# Patient Record
Sex: FEMALE | Race: WHITE | Employment: STUDENT | URBAN - METROPOLITAN AREA
[De-identification: names, ages, dates, MRNs, and addresses within clinical notes are randomized per-mention and may not be internally consistent; named-entity substitution may affect disease eponyms.]

---
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
## 2024-01-03 ENCOUNTER — OFFICE (OUTPATIENT)
Dept: URBAN - METROPOLITAN AREA PATHOLOGY 1 | Facility: PATHOLOGY | Age: 19
End: 2024-01-03
Payer: COMMERCIAL

## 2024-01-03 ENCOUNTER — AMBULATORY SURGICAL CENTER (OUTPATIENT)
Dept: URBAN - METROPOLITAN AREA SURGERY 7 | Facility: SURGERY | Age: 19
End: 2024-01-03
Payer: COMMERCIAL

## 2024-01-03 VITALS
DIASTOLIC BLOOD PRESSURE: 68 MMHG | HEART RATE: 90 BPM | HEIGHT: 69 IN | RESPIRATION RATE: 16 BRPM | DIASTOLIC BLOOD PRESSURE: 52 MMHG | OXYGEN SATURATION: 95 % | SYSTOLIC BLOOD PRESSURE: 130 MMHG | SYSTOLIC BLOOD PRESSURE: 107 MMHG | SYSTOLIC BLOOD PRESSURE: 130 MMHG | RESPIRATION RATE: 18 BRPM | HEART RATE: 72 BPM | HEART RATE: 64 BPM | HEART RATE: 79 BPM | HEART RATE: 77 BPM | RESPIRATION RATE: 16 BRPM | RESPIRATION RATE: 20 BRPM | RESPIRATION RATE: 19 BRPM | OXYGEN SATURATION: 94 % | HEART RATE: 65 BPM | DIASTOLIC BLOOD PRESSURE: 83 MMHG | OXYGEN SATURATION: 95 % | DIASTOLIC BLOOD PRESSURE: 55 MMHG | RESPIRATION RATE: 22 BRPM | DIASTOLIC BLOOD PRESSURE: 96 MMHG | HEART RATE: 97 BPM | DIASTOLIC BLOOD PRESSURE: 83 MMHG | SYSTOLIC BLOOD PRESSURE: 118 MMHG | HEART RATE: 77 BPM | HEART RATE: 114 BPM | SYSTOLIC BLOOD PRESSURE: 136 MMHG | OXYGEN SATURATION: 91 % | RESPIRATION RATE: 20 BRPM | OXYGEN SATURATION: 96 % | TEMPERATURE: 98.8 F | WEIGHT: 200 LBS | SYSTOLIC BLOOD PRESSURE: 125 MMHG | HEART RATE: 90 BPM | DIASTOLIC BLOOD PRESSURE: 72 MMHG | DIASTOLIC BLOOD PRESSURE: 72 MMHG | SYSTOLIC BLOOD PRESSURE: 134 MMHG | RESPIRATION RATE: 22 BRPM | DIASTOLIC BLOOD PRESSURE: 106 MMHG | HEART RATE: 92 BPM | WEIGHT: 200 LBS | OXYGEN SATURATION: 98 % | DIASTOLIC BLOOD PRESSURE: 96 MMHG | DIASTOLIC BLOOD PRESSURE: 58 MMHG | DIASTOLIC BLOOD PRESSURE: 106 MMHG | DIASTOLIC BLOOD PRESSURE: 62 MMHG | OXYGEN SATURATION: 100 % | HEART RATE: 97 BPM | SYSTOLIC BLOOD PRESSURE: 152 MMHG | SYSTOLIC BLOOD PRESSURE: 134 MMHG | OXYGEN SATURATION: 98 % | HEIGHT: 69 IN | SYSTOLIC BLOOD PRESSURE: 118 MMHG | OXYGEN SATURATION: 91 % | DIASTOLIC BLOOD PRESSURE: 67 MMHG | SYSTOLIC BLOOD PRESSURE: 119 MMHG | SYSTOLIC BLOOD PRESSURE: 147 MMHG | SYSTOLIC BLOOD PRESSURE: 121 MMHG | OXYGEN SATURATION: 100 % | DIASTOLIC BLOOD PRESSURE: 62 MMHG | SYSTOLIC BLOOD PRESSURE: 113 MMHG | OXYGEN SATURATION: 96 % | RESPIRATION RATE: 19 BRPM | DIASTOLIC BLOOD PRESSURE: 69 MMHG | SYSTOLIC BLOOD PRESSURE: 119 MMHG | DIASTOLIC BLOOD PRESSURE: 58 MMHG | DIASTOLIC BLOOD PRESSURE: 55 MMHG | SYSTOLIC BLOOD PRESSURE: 147 MMHG | TEMPERATURE: 98.8 F | DIASTOLIC BLOOD PRESSURE: 52 MMHG | SYSTOLIC BLOOD PRESSURE: 125 MMHG | SYSTOLIC BLOOD PRESSURE: 121 MMHG | DIASTOLIC BLOOD PRESSURE: 69 MMHG | HEART RATE: 65 BPM | SYSTOLIC BLOOD PRESSURE: 113 MMHG | HEART RATE: 79 BPM | HEART RATE: 92 BPM | RESPIRATION RATE: 18 BRPM | HEART RATE: 72 BPM | SYSTOLIC BLOOD PRESSURE: 136 MMHG | HEART RATE: 64 BPM | DIASTOLIC BLOOD PRESSURE: 68 MMHG | HEART RATE: 114 BPM | SYSTOLIC BLOOD PRESSURE: 152 MMHG | DIASTOLIC BLOOD PRESSURE: 67 MMHG | OXYGEN SATURATION: 94 % | SYSTOLIC BLOOD PRESSURE: 107 MMHG

## 2024-01-03 DIAGNOSIS — R10.84 GENERALIZED ABDOMINAL PAIN: ICD-10-CM

## 2024-01-03 DIAGNOSIS — R19.4 CHANGE IN BOWEL HABIT: ICD-10-CM

## 2024-01-03 DIAGNOSIS — K31.89 OTHER DISEASES OF STOMACH AND DUODENUM: ICD-10-CM

## 2024-01-03 DIAGNOSIS — K29.30 CHRONIC SUPERFICIAL GASTRITIS WITHOUT BLEEDING: ICD-10-CM

## 2024-01-03 PROCEDURE — 45380 COLONOSCOPY AND BIOPSY: CPT | Performed by: INTERNAL MEDICINE

## 2024-01-03 PROCEDURE — 43239 EGD BIOPSY SINGLE/MULTIPLE: CPT | Performed by: INTERNAL MEDICINE

## 2024-01-03 PROCEDURE — 88305 TISSUE EXAM BY PATHOLOGIST: CPT | Performed by: PATHOLOGY

## 2024-01-03 PROCEDURE — 88342 IMHCHEM/IMCYTCHM 1ST ANTB: CPT | Performed by: PATHOLOGY

## 2024-01-08 LAB
PDF: PDF REPORT: (no result)
PDF: PDF REPORT: (no result)

## 2024-01-26 ENCOUNTER — OFFICE VISIT (OUTPATIENT)
Dept: PSYCHOLOGY | Age: 19
End: 2024-01-26

## 2024-01-26 DIAGNOSIS — F41.1 GAD (GENERALIZED ANXIETY DISORDER): Primary | ICD-10-CM

## 2024-01-26 RX ORDER — SERTRALINE HYDROCHLORIDE 25 MG/1
25 TABLET, FILM COATED ORAL DAILY
Qty: 30 TABLET | Refills: 0 | Status: SHIPPED | OUTPATIENT
Start: 2024-01-26

## 2024-01-26 NOTE — PROGRESS NOTES
PSYCHIATRY ATTENDING NOTE    S: Patient being seen at [facility] in follow-up for generalized anxiety and OCD. No medication started at initial appointment on 12/15/23.    Kell presents in fair spirits  After further discussion with therapist Muriel, patient would like to move forward with medication  Still functioning fine academically and with lacrosse but still having obsessions and compulsions that she would like to see diminish  Discussed medication in detail - agreeable to Zoloft    MSE: White female appears age. Pleasant, cooperative, forthcoming. Normal psychomotor activity, gait, strength, tone, eye contact. Mood euthymic. Affect flexible. Speech clear. Thoughts organized. Content future-oriented. Noted obsessions and compulsions. No suicidal or homicidal ideations. No paranoia, delusions, hallucinations. Orientation, concentration, recent and remote memory are grossly intact. Fund of knowledge fair. Language use fair. Insight and judgment fair.     MEDICATIONS:  Zoloft 25 mg nightly (new)    ASSESSMENT:     JOSE CRUZ  OCD    PLAN: Support, reassurance provided. Patient agreeable to try medication and will start Zoloft. Risks/benefits/alternatives discussed. Boxed warning discussed. Discussed that medication takes 4-6 weeks for full effect and can be longer with OCD. Continue to monitor symptoms, side effects and response to medication. Adjust accordingly; plan to slowly titrate as tolerated. Continue individual therapy. See back 2-3 weeks.                           Electronically signed by Mayco Batista MD on 1/26/2024 at 11:51 AM

## 2024-02-13 ENCOUNTER — OFFICE VISIT (OUTPATIENT)
Dept: PSYCHOLOGY | Age: 19
End: 2024-02-13

## 2024-02-13 DIAGNOSIS — F41.1 GAD (GENERALIZED ANXIETY DISORDER): Primary | ICD-10-CM

## 2024-02-13 PROCEDURE — 99214 OFFICE O/P EST MOD 30 MIN: CPT | Performed by: PSYCHIATRY & NEUROLOGY

## 2024-02-13 NOTE — PROGRESS NOTES
PSYCHIATRY ATTENDING NOTE    S: Patient being seen at [facility] in follow-up for generalized anxiety and OCD. Zoloft started last appointment. Met with patient to discuss progress with treatment.    Kell presents in fair spirits  Had a little GI upset with the Zoloft at first but dissipated now  No other side effects, discussed further titration of Zoloft  Quit lacrosse team - feels was having detrimental effect on mental and physical health  Discussed details of interactions with coaching staff; family supportive of decision  Seeing Muriel on campus for therapy  No acute issues otherwise     MSE: White female appears age. Pleasant, cooperative, forthcoming. Normal psychomotor activity, gait, strength, tone, eye contact. Mood euthymic. Affect flexible. Speech clear. Thoughts organized. Content future-oriented. Noted obsessions and compulsions. No suicidal or homicidal ideations. No paranoia, delusions, hallucinations. Orientation, concentration, recent and remote memory are grossly intact. Fund of knowledge fair. Language use fair. Insight and judgment fair.     MEDICATIONS:  Zoloft 50 mg nightly (increasing)     ASSESSMENT:     JOSE CRUZ  OCD    PLAN: Support, reassurance provided. Increase Zoloft to 50 mg and if tolerating without incident will likely increase to  mg at next appointment in two weeks. Again discussed that it can take 8-12 weeks with SSRI when treating obsessions/compulsions and oftentimes the dose needs to be near maximal. Continue to monitor symptoms, side effects and response to medications. Adjust as needed.                          Electronically signed by Mayco Batista MD on 2/13/2024 at 10:18 AM

## 2024-02-27 ENCOUNTER — OFFICE VISIT (OUTPATIENT)
Dept: PSYCHOLOGY | Age: 19
End: 2024-02-27

## 2024-02-27 DIAGNOSIS — F41.1 GAD (GENERALIZED ANXIETY DISORDER): Primary | ICD-10-CM

## 2024-02-27 PROCEDURE — 99214 OFFICE O/P EST MOD 30 MIN: CPT | Performed by: PSYCHIATRY & NEUROLOGY

## 2024-02-27 NOTE — PROGRESS NOTES
PSYCHIATRY ATTENDING NOTE    S: Patient being seen at [facility] in follow-up for generalized anxiety and OCD. Zoloft increased to 50 mg last appointment. Met with patient to discuss progress with treatment.    Kell presents in fair spirits  Notes some insomnia but feels related to sudden drop-off of physical activity after stopping lacrosse  Discussed that if it persists she can try taking the Zoloft in the morning if needed  Also notes headaches but believes related to needing eyes checked/contacts adjusted  Discussed options of keeping Zoloft at 50 mg for a bit longer or further titration  Ultimately mutually agreed to increase to 75 mg at this time  Patient feels medication starting to help her feel a little more relaxed; not getting worked up as easily  Also therapy with Muriel has been therapeutic  Still trying to adjust to life after lacrosse but feels like she has more balance now  No acute issues or concerns otherwise     MSE: White female appears age. Pleasant, cooperative, forthcoming. Normal psychomotor activity, gait, strength, tone, eye contact. Mood euthymic. Affect flexible. Speech clear. Thoughts organized. Content future-oriented. Noted obsessions and compulsions. No suicidal or homicidal ideations. No paranoia, delusions, hallucinations. Orientation, concentration, recent and remote memory are grossly intact. Fund of knowledge fair. Language use fair. Insight and judgment fair.     MEDICATIONS:  Zoloft 75 mg nightly (increasing)     ASSESSMENT:     JOSE CRUZ  OCD    PLAN: Support, reassurance provided. Increase Zoloft to 75 mg and if tolerating will likely increase to 100 mg in two weeks. Continue to monitor symptoms, side effects and response to medications. Adjust as needed. Continue therapy. See back two weeks.                           Electronically signed by Mayco Batista MD on 2/27/2024 at 10:18 AM

## 2024-03-12 ENCOUNTER — OFFICE VISIT (OUTPATIENT)
Dept: PSYCHOLOGY | Age: 19
End: 2024-03-12

## 2024-03-12 DIAGNOSIS — F41.1 GAD (GENERALIZED ANXIETY DISORDER): Primary | ICD-10-CM

## 2024-03-12 PROCEDURE — 99214 OFFICE O/P EST MOD 30 MIN: CPT | Performed by: PSYCHIATRY & NEUROLOGY

## 2024-03-12 RX ORDER — SERTRALINE HYDROCHLORIDE 100 MG/1
100 TABLET, FILM COATED ORAL DAILY
Qty: 30 TABLET | Refills: 0 | Status: SHIPPED | OUTPATIENT
Start: 2024-03-12

## 2024-03-12 NOTE — PROGRESS NOTES
PSYCHIATRY ATTENDING NOTE    S: Patient being seen at [facility] in follow-up for generalized anxiety and OCD. Zoloft increased to 75 mg last appointment. Met with patient to discuss progress with treatment.    Kell presents in good spirits  Headaches resolving  Still not sleeping well - discussed moving Zoloft to morning  Feels 75 mg helpful and agreeable to increase to 100 mg  Still having obsessions/compulsions but not as distressed  Feels medication has taken an edge off  Had panic attack but even this was attenuated   Had relaxing spring break  Adjusting to life after lacrosse  Functioning well academically  In therapy with Muriel at [facility] center  No acute issues or concerns otherwise     MSE: White female appears age. Pleasant, cooperative, forthcoming. Normal psychomotor activity, gait, strength, tone, eye contact. Mood euthymic. Affect flexible. Speech clear. Thoughts organized. Content future-oriented. No suicidal or homicidal ideations. No paranoia, delusions, hallucinations. Orientation, concentration, recent and remote memory are grossly intact. Fund of knowledge fair. Language use fair. Insight and judgment fair.     MEDICATIONS:  Zoloft 100 mg daily (increasing)     ASSESSMENT:     JOSE CRUZ  OCD    PLAN: Support, reassurance provided. Increase Zoloft to 100 mg and move to morning. Continue to monitor symptoms, side effects and response to medications. Adjust as needed. Continue therapy. See back four weeks.                           Electronically signed by Mayco Batista MD on 3/12/2024 at 10:26 AM

## 2024-04-12 ENCOUNTER — OFFICE VISIT (OUTPATIENT)
Dept: PSYCHOLOGY | Age: 19
End: 2024-04-12

## 2024-04-12 DIAGNOSIS — F41.1 GAD (GENERALIZED ANXIETY DISORDER): Primary | ICD-10-CM

## 2024-04-12 RX ORDER — SERTRALINE HYDROCHLORIDE 100 MG/1
100 TABLET, FILM COATED ORAL DAILY
Qty: 30 TABLET | Refills: 0 | Status: SHIPPED | OUTPATIENT
Start: 2024-04-12

## 2024-05-03 ENCOUNTER — OFFICE VISIT (OUTPATIENT)
Dept: PSYCHOLOGY | Age: 19
End: 2024-05-03

## 2024-05-03 DIAGNOSIS — F41.1 GAD (GENERALIZED ANXIETY DISORDER): Primary | ICD-10-CM

## 2024-05-03 RX ORDER — SERTRALINE HYDROCHLORIDE 100 MG/1
100 TABLET, FILM COATED ORAL DAILY
Qty: 90 TABLET | Refills: 1 | Status: SHIPPED | OUTPATIENT
Start: 2024-05-03

## 2024-05-03 NOTE — PROGRESS NOTES
PSYCHIATRY ATTENDING NOTE    S: Patient being seen at [facility] in follow-up for generalized anxiety and OCD. Met with patient to discuss progress with treatment.    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 Pleasant, cooperative, forthcoming. Normal psychomotor activity, gait, strength, tone, eye contact. Mood euthymic. Affect flexible. Speech clear. Thoughts organized. Content future-oriented. No suicidal or homicidal ideations. No paranoia, delusions, hallucinations. Orientation, concentration, recent and remote memory are grossly intact. Fund of knowledge fair. Language use fair. Insight and judgment fair.     MEDICATIONS:  Zoloft 100 mg daily (cont)     Melatonin 5 mg qhs (cont)    ASSESSMENT:     JOSE CRUZ  OCD    PLAN: Support, reassurance provided. Continue current treatment. Continue to monitor symptoms, side effects and response to medications. Adjust as needed. Continue therapy. See back in fall - call sooner if any issues.                           Electronically signed by Mayco Batista MD on 5/3/2024 at 10:53 AM

## 2024-08-23 ENCOUNTER — OFFICE VISIT (OUTPATIENT)
Dept: PSYCHOLOGY | Age: 19
End: 2024-08-23

## 2024-08-23 DIAGNOSIS — F41.1 GAD (GENERALIZED ANXIETY DISORDER): Primary | ICD-10-CM

## 2024-08-23 RX ORDER — TRAZODONE HYDROCHLORIDE 50 MG/1
50 TABLET ORAL NIGHTLY PRN
Qty: 30 TABLET | Refills: 0 | Status: SHIPPED | OUTPATIENT
Start: 2024-08-23

## 2024-08-23 RX ORDER — SERTRALINE HYDROCHLORIDE 100 MG/1
150 TABLET, FILM COATED ORAL DAILY
Qty: 45 TABLET | Refills: 0 | Status: SHIPPED | OUTPATIENT
Start: 2024-08-23

## 2024-08-23 NOTE — PROGRESS NOTES
PSYCHIATRY ATTENDING NOTE    S: Patient being seen at [facility] in follow-up for generalized anxiety and OCD.    Kell presents in fair spirits  Reports summer was challenging - clashing with parents, happy to be back on campus  Having issues with insomnia - increased melatonin to 10 mg which helped for a while  Feels anxiety has been higher as well - racing thoughts, worries, intrusive thoughts   Seeing therapist online which has been helpful  Anticipates this will be last undergraduate semester  Wants to apply for master's program which is 2-3 years  Reviewed medications and discussed adjustments     MSE: White female appears age. Pleasant, cooperative, forthcoming. Normal psychomotor activity, gait, strength, tone, eye contact. Mood euthymic. Affect flexible. Speech clear. Thoughts organized. Content future-oriented. No suicidal or homicidal ideations. No paranoia, delusions, hallucinations. Orientation, concentration, recent and remote memory are grossly intact. Fund of knowledge fair. Language use fair. Insight and judgment fair.     MEDICATIONS:  Zoloft 150 mg daily (increasing)     Trazodone 50 mg qhs prn (new)     Melatonin 10 mg qhs (cont)    ASSESSMENT:     JOSE CRUZ  OCD    PLAN: Support, reassurance provided. Optimize Zoloft. Trial of low-dose Trazodone for sleep. Continue to monitor symptoms, side effects and response to medications. Adjust as needed. Continue therapy. See back 4 weeks - call sooner if needed.                           Electronically signed by Mayco Batista MD on 8/23/2024 at 10:27 AM

## 2024-09-20 ENCOUNTER — OFFICE VISIT (OUTPATIENT)
Dept: PSYCHOLOGY | Age: 19
End: 2024-09-20

## 2024-09-20 DIAGNOSIS — F41.1 GAD (GENERALIZED ANXIETY DISORDER): Primary | ICD-10-CM

## 2024-09-20 PROCEDURE — 99214 OFFICE O/P EST MOD 30 MIN: CPT | Performed by: PSYCHIATRY & NEUROLOGY

## 2024-09-20 RX ORDER — TRAZODONE HYDROCHLORIDE 50 MG/1
50 TABLET, FILM COATED ORAL NIGHTLY PRN
Qty: 30 TABLET | Refills: 2 | Status: SHIPPED | OUTPATIENT
Start: 2024-09-20

## 2024-09-20 RX ORDER — SERTRALINE HYDROCHLORIDE 100 MG/1
150 TABLET, FILM COATED ORAL DAILY
Qty: 45 TABLET | Refills: 2 | Status: SHIPPED | OUTPATIENT
Start: 2024-09-20

## 2024-10-02 ENCOUNTER — OFFICE VISIT (OUTPATIENT)
Dept: PRIMARY CARE CLINIC | Age: 19
End: 2024-10-02

## 2024-10-02 ENCOUNTER — HOSPITAL ENCOUNTER (OUTPATIENT)
Age: 19
Discharge: HOME | End: 2024-10-04
Payer: COMMERCIAL

## 2024-10-02 ENCOUNTER — HOSPITAL ENCOUNTER (OUTPATIENT)
Dept: GENERAL RADIOLOGY | Age: 19
Discharge: HOME | End: 2024-10-04
Payer: COMMERCIAL

## 2024-10-02 VITALS
DIASTOLIC BLOOD PRESSURE: 60 MMHG | WEIGHT: 195 LBS | SYSTOLIC BLOOD PRESSURE: 127 MMHG | HEIGHT: 70 IN | HEART RATE: 70 BPM | BODY MASS INDEX: 27.92 KG/M2 | TEMPERATURE: 97.8 F | OXYGEN SATURATION: 100 % | RESPIRATION RATE: 16 BRPM

## 2024-10-02 DIAGNOSIS — K59.00 CONSTIPATION, UNSPECIFIED CONSTIPATION TYPE: ICD-10-CM

## 2024-10-02 DIAGNOSIS — K58.2 IRRITABLE BOWEL SYNDROME WITH BOTH CONSTIPATION AND DIARRHEA: ICD-10-CM

## 2024-10-02 DIAGNOSIS — R10.32 LLQ PAIN: Primary | ICD-10-CM

## 2024-10-02 PROCEDURE — 74018 RADEX ABDOMEN 1 VIEW: CPT

## 2024-10-02 PROCEDURE — 99214 OFFICE O/P EST MOD 30 MIN: CPT

## 2024-10-02 RX ORDER — POLYETHYLENE GLYCOL 3350 17 G/17G
17 POWDER, FOR SOLUTION ORAL DAILY
COMMUNITY
Start: 2024-10-02 | End: 2024-11-01

## 2024-10-02 RX ORDER — MELATONIN 10 MG
10 CAPSULE ORAL NIGHTLY
COMMUNITY

## 2024-10-02 NOTE — PROGRESS NOTES
Chief Complaint:       Nausea (Started 3 days ago with left lower abdominal pain, tenderness, nausea, diarrhea. Second time had exact same symptoms from two weeks ago (started with constipation then same symptoms). Was drinking/partying prior to symptoms starting. (Currently being treated for IBS, had significant GI workup in 12/2023))      History of Present Illness   Source of history provided by:  patient.    Joellen Davalos is a 19 y.o. old female who presents to walk-in for complaints of intermittent episodes sharp pain in the LLQ without radiation which began 4 day(s) prior to arrival.   There has been similar episodes in the past.  Last episode was 2 weeks ago. since onset the symptoms have been intermittent.  The pain is associated with constipation and diarrhea.  The pain is aggravated by bowel movement and eating and relieved by rest but dull aching is constant.  There has been NO back pain, chills, cloudy urine, dysuria, headache, hematuria, sweating, urinary frequency, urinary incontinence, urinary urgency, vaginal itching, or vomiting. LMP was currently on menstrual cycle which started 5 days ago.  Patient does report that she often has painful periods but feels like this pain is different.  Patient does have a history of IBS, does take Bentyl daily, has prescription for Zofran but does not take it actively.  Patient does see a GI doctor in [facility] and had an endoscopy as well as a colonoscopy in December.  This episode started after drinking the day before onset.  Patient does report that she had vomiting the day after drinking and then pain started with constipation.        ROS    Unless otherwise stated in this report or unable to obtain because of the patient's clinical or mental status as evidenced by the medical record, this patients's positive and negative responses for Review of Systems, constitutional, psych, eyes, ENT, cardiovascular, respiratory, gastrointestinal,

## 2024-10-08 ENCOUNTER — OFFICE VISIT (OUTPATIENT)
Dept: PRIMARY CARE CLINIC | Age: 19
End: 2024-10-08

## 2024-10-08 VITALS
WEIGHT: 195 LBS | RESPIRATION RATE: 16 BRPM | DIASTOLIC BLOOD PRESSURE: 74 MMHG | OXYGEN SATURATION: 99 % | HEIGHT: 70 IN | BODY MASS INDEX: 27.92 KG/M2 | TEMPERATURE: 98.2 F | SYSTOLIC BLOOD PRESSURE: 117 MMHG | HEART RATE: 63 BPM

## 2024-10-08 DIAGNOSIS — L50.3 DERMATOGRAPHIA: ICD-10-CM

## 2024-10-08 DIAGNOSIS — R10.11 RUQ PAIN: Primary | ICD-10-CM

## 2024-10-08 DIAGNOSIS — K58.2 IRRITABLE BOWEL SYNDROME WITH BOTH CONSTIPATION AND DIARRHEA: ICD-10-CM

## 2024-10-08 DIAGNOSIS — F41.1 GAD (GENERALIZED ANXIETY DISORDER): ICD-10-CM

## 2024-10-08 PROCEDURE — 99204 OFFICE O/P NEW MOD 45 MIN: CPT | Performed by: FAMILY MEDICINE

## 2024-10-08 PROCEDURE — G2211 COMPLEX E/M VISIT ADD ON: HCPCS | Performed by: FAMILY MEDICINE

## 2024-10-08 SDOH — ECONOMIC STABILITY: FOOD INSECURITY: WITHIN THE PAST 12 MONTHS, THE FOOD YOU BOUGHT JUST DIDN'T LAST AND YOU DIDN'T HAVE MONEY TO GET MORE.: NEVER TRUE

## 2024-10-08 SDOH — ECONOMIC STABILITY: FOOD INSECURITY: WITHIN THE PAST 12 MONTHS, YOU WORRIED THAT YOUR FOOD WOULD RUN OUT BEFORE YOU GOT MONEY TO BUY MORE.: NEVER TRUE

## 2024-10-08 SDOH — ECONOMIC STABILITY: INCOME INSECURITY: HOW HARD IS IT FOR YOU TO PAY FOR THE VERY BASICS LIKE FOOD, HOUSING, MEDICAL CARE, AND HEATING?: NOT HARD AT ALL

## 2024-10-08 ASSESSMENT — PATIENT HEALTH QUESTIONNAIRE - PHQ9
SUM OF ALL RESPONSES TO PHQ9 QUESTIONS 1 & 2: 0
2. FEELING DOWN, DEPRESSED OR HOPELESS: NOT AT ALL
SUM OF ALL RESPONSES TO PHQ QUESTIONS 1-9: 0
1. LITTLE INTEREST OR PLEASURE IN DOING THINGS: NOT AT ALL

## 2024-10-08 NOTE — PROGRESS NOTES
Joellen Anoop  : 2005    Chief Complaint:     Chief Complaint   Patient presents with    New Patient     Establish care.     HPI  Here to establish. 4 weeks ago stomach pain. Ate salad, dairy free cookie, sorbet. Severe stomach pain and cramping for about an hour until she could have a bm. Appetite has been suppressed.    PMHx  Follows with dr Batista    IBS mixed- follows with a GI in [place]. Taking bentyl once daily and has miralax for PRN.  Concerned for gallbladder disease, but has never had US, would like to look into this.     Dermatographia - allegra 2 tabs daily with atarax PRN    Melatonin, trazodone, zoloft    ROS   + for constipation/diarrhea  + for positional dizziness.     PE: Benign    LMP: week ago    Sexually active, no contraception.   Single female partner.     Past medical, surgical, family and social histories reviewed and updated today as appropriate    Health Maintenance Due   Topic Date Due    HIV screen  Never done    Chlamydia/GC screen  Never done    Hepatitis C screen  Never done    Flu vaccine (1) 2024    COVID-19 Vaccine (2023- season) 2024       Current Outpatient Medications   Medication Sig Dispense Refill    melatonin 10 MG CAPS capsule Take 1 capsule by mouth nightly      polyethylene glycol (MIRALAX) 17 GM/SCOOP powder Take 17 g by mouth daily      sertraline (ZOLOFT) 100 MG tablet Take 1.5 tablets by mouth daily 45 tablet 2    traZODone (DESYREL) 50 MG tablet Take 1 tablet by mouth nightly as needed for Sleep 30 tablet 2    fexofenadine (ALLEGRA ALLERGY) 180 MG tablet Take 1 tablet by mouth 2 times daily      dicyclomine (BENTYL) 10 MG capsule Take 1 capsule by mouth 4 times daily as needed (abdominal cramping) 40 capsule 0    hydrOXYzine HCl (ATARAX) 10 MG tablet Take 2 tablets by mouth nightly      ondansetron (ZOFRAN-ODT) 4 MG disintegrating tablet Place 1 tablet under the tongue 3 times daily as needed for Nausea or Vomiting (Patient not

## 2024-10-25 ENCOUNTER — OFFICE VISIT (OUTPATIENT)
Dept: PSYCHOLOGY | Age: 19
End: 2024-10-25

## 2024-10-25 DIAGNOSIS — F43.23 ADJUSTMENT DISORDER WITH MIXED ANXIETY AND DEPRESSED MOOD: Primary | ICD-10-CM

## 2024-10-25 NOTE — PROGRESS NOTES
PSYCHIATRY ATTENDING NOTE    S: Patient being seen at [facility] in follow-up for generalized anxiety and OCD.     Kell presents slightly dysphoric - report feeling \"not particularly great\"  Was doing OK but then started feeling \"disconnected\" about 3 weeks ago  Notes depressed mood, mild anhedonia, fatigue  Then felt better for a couple days but \"crashed\" again last weekend  Had panic attack Sunday when visiting girlfriend in [place]  Debriefed with therapist on Tuesday and had another mild panic attack afterward  Patient graduating in December and still considering options after that  Feeling pressure from parents to move back home and go to graduate school  Patient does not necessarily want to do either but struggling to effectively communicate with them  Reviewed medication regimen; feels Trazodone causing morning sedation  No issues or concerns otherwise    MSE: White female appears age. Pleasant, cooperative, forthcoming. Normal psychomotor activity, gait, strength, tone, eye contact. Mood dysphoric. Affect flexible. Speech clear. Thoughts organized. Content future-oriented. No suicidal or homicidal ideations. No paranoia, delusions, hallucinations. Orientation, concentration, recent and remote memory are grossly intact. Fund of knowledge fair. Language use fair. Insight and judgment fair.     MEDICATIONS:  Zoloft 150 mg daily (cont)     Melatonin 10 mg qhs (cont)     Stop Trazodone     ASSESSMENT:     Adjustment Disorder with mixed anxiety and depressed mood  JOSE CRUZ  OCD    PLAN: Support, reassurance provided. Eliminate Trazodone due to side effects. No indication to start additional medication at this time. Continue to monitor symptoms, side effects and response to medications. Adjust as needed. Continue therapy. See back 2 weeks - call sooner if needed.                           Electronically signed by Mayco Batista MD on 10/25/2024 at 10:28 AM

## 2024-10-28 PROBLEM — K58.2 IRRITABLE BOWEL SYNDROME WITH BOTH CONSTIPATION AND DIARRHEA: Status: ACTIVE | Noted: 2024-10-28

## 2024-10-28 PROBLEM — L50.3 DERMATOGRAPHIA: Status: ACTIVE | Noted: 2024-10-28

## 2024-10-28 PROBLEM — R10.11 RUQ PAIN: Status: ACTIVE | Noted: 2024-10-28

## 2024-10-28 PROBLEM — F41.1 GAD (GENERALIZED ANXIETY DISORDER): Status: ACTIVE | Noted: 2024-10-28

## 2024-10-28 ASSESSMENT — ENCOUNTER SYMPTOMS
CONSTIPATION: 1
ANAL BLEEDING: 0
BLOOD IN STOOL: 0
CHEST TIGHTNESS: 0
SHORTNESS OF BREATH: 0
COLOR CHANGE: 0
WHEEZING: 0
COUGH: 0
ABDOMINAL PAIN: 1
NAUSEA: 0
VOMITING: 0
DIARRHEA: 1

## 2024-11-15 ENCOUNTER — OFFICE VISIT (OUTPATIENT)
Dept: PSYCHOLOGY | Age: 19
End: 2024-11-15

## 2024-11-15 DIAGNOSIS — F41.1 GAD (GENERALIZED ANXIETY DISORDER): ICD-10-CM

## 2024-11-15 DIAGNOSIS — F43.23 ADJUSTMENT DISORDER WITH MIXED ANXIETY AND DEPRESSED MOOD: Primary | ICD-10-CM

## 2024-11-15 PROCEDURE — 99214 OFFICE O/P EST MOD 30 MIN: CPT | Performed by: PSYCHIATRY & NEUROLOGY

## 2024-11-15 NOTE — PROGRESS NOTES
PSYCHIATRY ATTENDING NOTE    S: Patient being seen at [facility] in follow-up for generalized anxiety and OCD. Trazodone stopped last appointment.     Kell presents in better spirits today  Feels menstrual periods was playing a large role in prior anxiety/depression  Having positive discussions with parents regarding future  Father wants her to go to graduate school and doesn't care where she lives  Mother wants her to live at home and doesn't care if she goes to graduate school  Discussed with patient she ultimately should make the decisions that feel right for her  Also discussed relationship dynamics with girlfriend and they are working on communication  Seeing therapist online still but not sure if she is going to continue this  Reviewed medication regimen - grogginess resolved now since Trazodone discontinued  Sleeping about 7 hours a night with melatonin  No issues or concerns otherwise    MSE: White female appears age. Pleasant, cooperative, forthcoming. Normal psychomotor activity, gait, strength, tone, eye contact. Mood improved. Affect flexible. Speech clear. Thoughts organized. Content future-oriented. No suicidal or homicidal ideations. No paranoia, delusions, hallucinations. Orientation, concentration, recent and remote memory are grossly intact. Fund of knowledge fair. Language use fair. Insight and judgment fair.     MEDICATIONS:  Zoloft 150 mg daily (cont)     Melatonin 10 mg qhs (cont)    ASSESSMENT:     Adjustment Disorder with mixed anxiety and depressed mood  JOSE CRUZ  OCD    PLAN: Support, reassurance provided. Continue same treatment. Continue to monitor symptoms, side effects and response to medications. Adjust as needed. Continue therapy. See back 3 weeks. Discussed transition of care options after graduation including resuming medication management at our [place] office and doing telehealth visits if needed.                          Electronically signed by Mayco Batista MD on

## 2024-12-06 ENCOUNTER — OFFICE VISIT (OUTPATIENT)
Dept: PSYCHOLOGY | Age: 19
End: 2024-12-06

## 2024-12-06 DIAGNOSIS — F43.23 ADJUSTMENT DISORDER WITH MIXED ANXIETY AND DEPRESSED MOOD: Primary | ICD-10-CM

## 2024-12-06 RX ORDER — SERTRALINE HYDROCHLORIDE 100 MG/1
150 TABLET, FILM COATED ORAL DAILY
Qty: 135 TABLET | Refills: 0 | Status: SHIPPED | OUTPATIENT
Start: 2024-12-06

## 2024-12-06 NOTE — PROGRESS NOTES
PSYCHIATRY ATTENDING NOTE    S: Patient being seen at [facility] in follow-up for generalized anxiety and OCD. No changes made last appointment.    Kell presents in good spirits today  Excited about graduation  Some situational stress with determining next steps  Likely going to apply for grad school for next fall  Will live at home for the time being if someone can sublease apartment here  Communicating effectively with parents and girlfriend   Therapy has been more beneficial  Mild increase in compulsions but nothing impairing   Mood stable, sleeping well with melatonin   No acute issues    MSE: White female appears age. Pleasant, cooperative, forthcoming. Normal psychomotor activity, gait, strength, tone, eye contact. Mood euthymic. Affect flexible. Speech clear. Thoughts organized. Content future-oriented. No suicidal or homicidal ideations. No paranoia, delusions, hallucinations. Orientation, concentration, recent and remote memory are grossly intact. Fund of knowledge fair. Language use fair. Insight and judgment fair.     MEDICATIONS:  Zoloft 150 mg daily (cont)     Melatonin 5-10 mg qhs (cont)    ASSESSMENT:     Adjustment Disorder with mixed anxiety and depressed mood  JOSE CRUZ  OCD    PLAN: Support, reassurance provided. Continue same treatment. Continue to monitor symptoms, side effects and response to medications. Adjust as needed. Continue therapy. See back 3 months at [place] office.                           Electronically signed by Mayco Batista MD on 12/6/2024 at 9:30 AM

## 2025-04-03 ENCOUNTER — OFFICE (OUTPATIENT)
Dept: URBAN - METROPOLITAN AREA CLINIC 18 | Age: 20
End: 2025-04-03
Payer: COMMERCIAL

## 2025-04-03 VITALS
HEIGHT: 69 IN | SYSTOLIC BLOOD PRESSURE: 152 MMHG | DIASTOLIC BLOOD PRESSURE: 86 MMHG | OXYGEN SATURATION: 97 % | HEART RATE: 71 BPM | WEIGHT: 203 LBS

## 2025-04-03 DIAGNOSIS — R10.84 GENERALIZED ABDOMINAL PAIN: ICD-10-CM

## 2025-04-03 PROCEDURE — 99213 OFFICE O/P EST LOW 20 MIN: CPT | Performed by: INTERNAL MEDICINE
